# Patient Record
Sex: MALE | Race: WHITE
[De-identification: names, ages, dates, MRNs, and addresses within clinical notes are randomized per-mention and may not be internally consistent; named-entity substitution may affect disease eponyms.]

---

## 2018-06-05 ENCOUNTER — RX ONLY (OUTPATIENT)
Age: 68
Setting detail: RX ONLY
End: 2018-06-05

## 2018-09-05 ENCOUNTER — APPOINTMENT (RX ONLY)
Dept: URBAN - METROPOLITAN AREA CLINIC 322 | Facility: CLINIC | Age: 68
Setting detail: DERMATOLOGY
End: 2018-09-05

## 2018-09-05 DIAGNOSIS — L259 CONTACT DERMATITIS AND OTHER ECZEMA, UNSPECIFIED CAUSE: ICD-10-CM

## 2018-09-05 PROBLEM — J30.1 ALLERGIC RHINITIS DUE TO POLLEN: Status: ACTIVE | Noted: 2018-09-05

## 2018-09-05 PROBLEM — L40.0 PSORIASIS VULGARIS: Status: ACTIVE | Noted: 2018-09-05

## 2018-09-05 PROBLEM — I10 ESSENTIAL (PRIMARY) HYPERTENSION: Status: ACTIVE | Noted: 2018-09-05

## 2018-09-05 PROBLEM — L23.9 ALLERGIC CONTACT DERMATITIS, UNSPECIFIED CAUSE: Status: ACTIVE | Noted: 2018-09-05

## 2018-09-05 PROCEDURE — ? COUNSELING

## 2018-09-05 PROCEDURE — ? TREATMENT REGIMEN

## 2018-09-05 PROCEDURE — ? PRESCRIPTION

## 2018-09-05 PROCEDURE — 99213 OFFICE O/P EST LOW 20 MIN: CPT

## 2018-09-05 RX ORDER — TRIAMCINOLONE ACETONIDE 1 MG/G
CREAM TOPICAL
Qty: 1 | Refills: 2 | Status: ERX | COMMUNITY
Start: 2018-09-05

## 2018-09-05 RX ADMIN — TRIAMCINOLONE ACETONIDE: 1 CREAM TOPICAL at 18:41

## 2018-09-05 ASSESSMENT — LOCATION SIMPLE DESCRIPTION DERM: LOCATION SIMPLE: RIGHT UPPER BACK

## 2018-09-05 ASSESSMENT — LOCATION DETAILED DESCRIPTION DERM: LOCATION DETAILED: RIGHT INFERIOR LATERAL UPPER BACK

## 2018-09-05 ASSESSMENT — LOCATION ZONE DERM: LOCATION ZONE: TRUNK

## 2018-09-05 NOTE — PROCEDURE: TREATMENT REGIMEN
Plan: Emphasized importance of switching to all hypoallergenic products - vanicream or free and clear only x 3 weeks, including shampoos.\\nMay use ELidel and TAC 0.1% cream bid prn flares\\nConsider patch testing - pt will schedule when convenient
Detail Level: Zone

## 2018-09-13 ENCOUNTER — APPOINTMENT (RX ONLY)
Dept: URBAN - METROPOLITAN AREA CLINIC 166 | Facility: CLINIC | Age: 68
Setting detail: DERMATOLOGY
End: 2018-09-13

## 2018-09-13 DIAGNOSIS — L21.8 OTHER SEBORRHEIC DERMATITIS: ICD-10-CM

## 2018-09-13 DIAGNOSIS — L20.89 OTHER ATOPIC DERMATITIS: ICD-10-CM

## 2018-09-13 PROBLEM — E78.5 HYPERLIPIDEMIA, UNSPECIFIED: Status: ACTIVE | Noted: 2018-09-13

## 2018-09-13 PROBLEM — I10 ESSENTIAL (PRIMARY) HYPERTENSION: Status: ACTIVE | Noted: 2018-09-13

## 2018-09-13 PROCEDURE — 99202 OFFICE O/P NEW SF 15 MIN: CPT

## 2018-09-13 PROCEDURE — ? PRESCRIPTION

## 2018-09-13 PROCEDURE — ? COUNSELING

## 2018-09-13 PROCEDURE — ? OTHER

## 2018-09-13 PROCEDURE — ? TREATMENT REGIMEN

## 2018-09-13 RX ORDER — METRONIDAZOLE 7.5 MG/G
CREAM TOPICAL
Qty: 1 | Refills: 2 | Status: ERX | COMMUNITY
Start: 2018-09-13

## 2018-09-13 RX ORDER — KETOCONAZOLE 20 MG/G
CREAM TOPICAL BID
Qty: 1 | Refills: 3 | Status: ERX | COMMUNITY
Start: 2018-09-13

## 2018-09-13 RX ADMIN — KETOCONAZOLE: 20 CREAM TOPICAL at 00:00

## 2018-09-13 RX ADMIN — METRONIDAZOLE: 7.5 CREAM TOPICAL at 00:00

## 2018-09-13 ASSESSMENT — LOCATION ZONE DERM
LOCATION ZONE: HAND
LOCATION ZONE: SCALP
LOCATION ZONE: FACE
LOCATION ZONE: ARM

## 2018-09-13 ASSESSMENT — LOCATION DETAILED DESCRIPTION DERM
LOCATION DETAILED: LEFT ULNAR DORSAL HAND
LOCATION DETAILED: LEFT INFERIOR CENTRAL MALAR CHEEK
LOCATION DETAILED: RIGHT ANTERIOR PROXIMAL UPPER ARM
LOCATION DETAILED: RIGHT ULNAR DORSAL HAND
LOCATION DETAILED: POSTERIOR MID-PARIETAL SCALP

## 2018-09-13 ASSESSMENT — LOCATION SIMPLE DESCRIPTION DERM
LOCATION SIMPLE: RIGHT UPPER ARM
LOCATION SIMPLE: LEFT HAND
LOCATION SIMPLE: POSTERIOR SCALP
LOCATION SIMPLE: LEFT CHEEK
LOCATION SIMPLE: RIGHT HAND

## 2018-09-13 NOTE — PROCEDURE: TREATMENT REGIMEN
Plan: Wash with head and shoulders shampoo
Detail Level: Zone
Discontinue Regimen: All other products
Initiate Treatment: Metronidazole cream twice a day
Continue Regimen: Triamcinolone cream. Patient has prescription at home
Plan: Keep skin moisturized

## 2018-09-13 NOTE — HPI: RASH
How Severe Is Your Rash?: moderate
Is This A New Presentation, Or A Follow-Up?: Rash
Additional History: Patient was using Elidel and Triamcinolone cream on his face for the last 6 week without taking a break. Stopped using creams  on Friday and his face flared on Saturday. Currently using Aloe Vera gel and Vanicream lite. Also using CBD oil. Previously differential diagnosis was Atopic dermatitis.

## 2018-09-13 NOTE — PROCEDURE: OTHER
Other (Free Text): Today appears to have rebound erythema from overuse of topical elidel & triamcinolone. Patient with baseline diagnosis of \"atopic dermatitis\" from prior derm, but today I see evidence of seborrheic dermatitis with erythema (from overuse of topical steroids). I'm going to recommend discontinue elidel & triamcinolone for the face. Instead, to use ketoconazole cream BID and Head and Shoulders shampoo. F/u in 3 weeks. At that time can consider adding a low potency topical steroid vs. biopsy.\\n\\n(The metronidazole prescription was an error and I called pharmacy to cancel the rx and instead fill the ketoconazole. Left voicemail with patient's home and patient's wife about error.)
Note Text (......Xxx Chief Complaint.): This diagnosis correlates with the
Detail Level: Zone

## 2018-09-17 ENCOUNTER — APPOINTMENT (RX ONLY)
Dept: URBAN - METROPOLITAN AREA CLINIC 170 | Facility: CLINIC | Age: 68
Setting detail: DERMATOLOGY
End: 2018-09-17

## 2018-09-17 DIAGNOSIS — L259 CONTACT DERMATITIS AND OTHER ECZEMA, UNSPECIFIED CAUSE: ICD-10-CM

## 2018-09-17 PROBLEM — L30.9 DERMATITIS, UNSPECIFIED: Status: ACTIVE | Noted: 2018-09-17

## 2018-09-17 PROCEDURE — 11100: CPT

## 2018-09-17 PROCEDURE — ? BIOPSY BY PUNCH METHOD

## 2018-09-17 PROCEDURE — ? COUNSELING

## 2018-09-17 PROCEDURE — ? PRESCRIPTION

## 2018-09-17 PROCEDURE — ? TREATMENT REGIMEN

## 2018-09-17 RX ORDER — HYDROCORTISONE 25 MG/G
CREAM TOPICAL
Qty: 1 | Refills: 1 | Status: ERX | COMMUNITY
Start: 2018-09-17

## 2018-09-17 RX ADMIN — HYDROCORTISONE: 25 CREAM TOPICAL at 00:00

## 2018-09-17 ASSESSMENT — LOCATION DETAILED DESCRIPTION DERM
LOCATION DETAILED: RIGHT INFERIOR CRUS OF ANTIHELIX
LOCATION DETAILED: LEFT CENTRAL MALAR CHEEK
LOCATION DETAILED: RIGHT MID PREAURICULAR CHEEK
LOCATION DETAILED: LEFT SUPERIOR CRUS OF ANTIHELIX

## 2018-09-17 ASSESSMENT — LOCATION SIMPLE DESCRIPTION DERM
LOCATION SIMPLE: LEFT CHEEK
LOCATION SIMPLE: LEFT EAR
LOCATION SIMPLE: RIGHT CHEEK
LOCATION SIMPLE: RIGHT EAR

## 2018-09-17 ASSESSMENT — LOCATION ZONE DERM
LOCATION ZONE: FACE
LOCATION ZONE: EAR

## 2018-09-17 NOTE — PROCEDURE: BIOPSY BY PUNCH METHOD
Hemostasis: None
Lab Facility: [id number]
Home Suture Removal Text: Patient was provided a home suture removal kit and will remove their sutures at home.  If they have any questions or difficulties they will call the office.
Suture Removal: 10 days
Anesthesia Type: 1% lidocaine with epinephrine
Billing Type: Third-Party Bill
Punch Size In Mm: 4
Bill 56939 For Specimen Handling/Conveyance To Laboratory?: no
Consent: Written consent was obtained and risks were reviewed including but not limited to scarring, infection, bleeding, scabbing, incomplete removal, nerve damage and allergy to anesthesia.
X Size Of Lesion In Cm (Optional): 0
Biopsy Type: H and E
Dressing: bandage
Epidermal Sutures: 4-0 Nylon
Detail Level: Detailed
Post-Care Instructions: I reviewed with the patient in detail post-care instructions. Patient is to keep the biopsy site dry overnight, and then apply bacitracin twice daily until healed. Patient may apply hydrogen peroxide soaks to remove any crusting.
Lab: [id number]
Notification Instructions: Patient will be notified of biopsy results. However, patient instructed to call the office if not contacted within 2 weeks.
Was A Bandage Applied: Yes
Anesthesia Volume In Cc (Will Not Render If 0): 0.5
Wound Care: Petrolatum

## 2018-09-17 NOTE — PROCEDURE: TREATMENT REGIMEN
Plan: Will apply patch tests this Friday at follow up.
Detail Level: Zone
Initiate Treatment: Apply hydrocortisone 2.5 cream to affected areas of rash on face and ears twice a day x2 weeks as needed. Will follow up on Friday.
Discontinue Regimen: Ketoconazole, continue not using elidel, and fragrant products.

## 2018-09-21 ENCOUNTER — APPOINTMENT (RX ONLY)
Dept: URBAN - METROPOLITAN AREA CLINIC 167 | Facility: CLINIC | Age: 68
Setting detail: DERMATOLOGY
End: 2018-09-21

## 2018-09-21 DIAGNOSIS — Z48.02 ENCOUNTER FOR REMOVAL OF SUTURES: ICD-10-CM

## 2018-09-21 DIAGNOSIS — L259 CONTACT DERMATITIS AND OTHER ECZEMA, UNSPECIFIED CAUSE: ICD-10-CM

## 2018-09-21 PROBLEM — L30.9 DERMATITIS, UNSPECIFIED: Status: ACTIVE | Noted: 2018-09-21

## 2018-09-21 PROCEDURE — ? STAPLE REMOVAL

## 2018-09-21 PROCEDURE — ? OTHER

## 2018-09-21 PROCEDURE — ? PATCH TESTING

## 2018-09-21 PROCEDURE — ? TREATMENT REGIMEN

## 2018-09-21 PROCEDURE — 95044 PATCH/APPLICATION TESTS: CPT

## 2018-09-21 PROCEDURE — ? COUNSELING

## 2018-09-21 ASSESSMENT — LOCATION SIMPLE DESCRIPTION DERM
LOCATION SIMPLE: RIGHT EAR
LOCATION SIMPLE: RIGHT CHEEK
LOCATION SIMPLE: LEFT EAR
LOCATION SIMPLE: LEFT CHEEK

## 2018-09-21 ASSESSMENT — LOCATION DETAILED DESCRIPTION DERM
LOCATION DETAILED: RIGHT MID PREAURICULAR CHEEK
LOCATION DETAILED: LEFT CENTRAL MALAR CHEEK
LOCATION DETAILED: RIGHT INFERIOR CRUS OF ANTIHELIX
LOCATION DETAILED: LEFT SUPERIOR CRUS OF ANTIHELIX

## 2018-09-21 ASSESSMENT — LOCATION ZONE DERM
LOCATION ZONE: FACE
LOCATION ZONE: EAR

## 2018-09-21 NOTE — PROCEDURE: STAPLE REMOVAL
Detail Level: Detailed
Add 37303 Cpt? (Important Note: In 2017 The Use Of 49345 Is Being Tracked By Cms To Determine Future Global Period Reimbursement For Global Periods): no

## 2018-09-21 NOTE — PROCEDURE: OTHER
Note Text (......Xxx Chief Complaint.): This diagnosis correlates with the
Other (Free Text): biopsy results: spongiotic reaction pattern: Ddx allergic contact dermatitis vs. seb derm. \\n\\nImproving on HC 2.5%. Will apply patch testing TRUE test today. F/u 3 days.
Detail Level: Zone

## 2018-09-24 ENCOUNTER — APPOINTMENT (RX ONLY)
Dept: URBAN - METROPOLITAN AREA CLINIC 170 | Facility: CLINIC | Age: 68
Setting detail: DERMATOLOGY
End: 2018-09-24

## 2018-09-24 DIAGNOSIS — L259 CONTACT DERMATITIS AND OTHER ECZEMA, UNSPECIFIED CAUSE: ICD-10-CM

## 2018-09-24 PROBLEM — L30.9 DERMATITIS, UNSPECIFIED: Status: ACTIVE | Noted: 2018-09-24

## 2018-09-24 PROCEDURE — ? TREATMENT REGIMEN

## 2018-09-24 PROCEDURE — ? COUNSELING

## 2018-09-24 PROCEDURE — ? TRUE TEST READING

## 2018-09-24 PROCEDURE — 99212 OFFICE O/P EST SF 10 MIN: CPT

## 2018-09-24 ASSESSMENT — LOCATION ZONE DERM
LOCATION ZONE: EAR
LOCATION ZONE: FACE

## 2018-09-24 ASSESSMENT — LOCATION SIMPLE DESCRIPTION DERM
LOCATION SIMPLE: LEFT EAR
LOCATION SIMPLE: RIGHT EAR
LOCATION SIMPLE: LEFT CHEEK

## 2018-09-24 ASSESSMENT — LOCATION DETAILED DESCRIPTION DERM
LOCATION DETAILED: RIGHT INFERIOR CRUS OF ANTIHELIX
LOCATION DETAILED: LEFT CENTRAL MALAR CHEEK
LOCATION DETAILED: LEFT SUPERIOR CRUS OF ANTIHELIX

## 2018-09-24 NOTE — PROCEDURE: TREATMENT REGIMEN
Detail Level: Zone
Plan: Head and shoulders shampoo once a day
Continue Regimen: Hydrocortisone cream 2.5% twice a day in two week intervals

## 2018-09-24 NOTE — PROCEDURE: TRUE TEST READING
Paraben Mix: no reaction
Number Of Patches Read: 36
Detail Level: Zone
Show Allergen Counseling In The Note?: No
Show Negative Results In The Note?: Yes

## 2018-10-01 ENCOUNTER — APPOINTMENT (RX ONLY)
Dept: URBAN - METROPOLITAN AREA CLINIC 170 | Facility: CLINIC | Age: 68
Setting detail: DERMATOLOGY
End: 2018-10-01

## 2018-10-01 DIAGNOSIS — L21.8 OTHER SEBORRHEIC DERMATITIS: ICD-10-CM

## 2018-10-01 DIAGNOSIS — L20.89 OTHER ATOPIC DERMATITIS: ICD-10-CM

## 2018-10-01 DIAGNOSIS — L259 CONTACT DERMATITIS AND OTHER ECZEMA, UNSPECIFIED CAUSE: ICD-10-CM | Status: STABLE

## 2018-10-01 PROBLEM — L30.9 DERMATITIS, UNSPECIFIED: Status: ACTIVE | Noted: 2018-10-01

## 2018-10-01 PROCEDURE — 99212 OFFICE O/P EST SF 10 MIN: CPT | Mod: 25

## 2018-10-01 PROCEDURE — ? COUNSELING

## 2018-10-01 PROCEDURE — ? TREATMENT REGIMEN

## 2018-10-01 PROCEDURE — ? OTHER

## 2018-10-01 PROCEDURE — 11100: CPT

## 2018-10-01 PROCEDURE — ? BIOPSY BY SHAVE METHOD

## 2018-10-01 PROCEDURE — 11101: CPT

## 2018-10-01 ASSESSMENT — LOCATION SIMPLE DESCRIPTION DERM
LOCATION SIMPLE: LEFT EAR
LOCATION SIMPLE: RIGHT EAR
LOCATION SIMPLE: RIGHT WRIST
LOCATION SIMPLE: LEFT CHEEK
LOCATION SIMPLE: LEFT FOREHEAD

## 2018-10-01 ASSESSMENT — LOCATION ZONE DERM
LOCATION ZONE: ARM
LOCATION ZONE: FACE
LOCATION ZONE: EAR

## 2018-10-01 ASSESSMENT — LOCATION DETAILED DESCRIPTION DERM
LOCATION DETAILED: RIGHT INFERIOR CRUS OF ANTIHELIX
LOCATION DETAILED: LEFT FOREHEAD
LOCATION DETAILED: LEFT CENTRAL MALAR CHEEK
LOCATION DETAILED: RIGHT LATERAL DORSAL WRIST
LOCATION DETAILED: LEFT SUPERIOR CRUS OF ANTIHELIX

## 2018-10-01 NOTE — PROCEDURE: BIOPSY BY SHAVE METHOD
Electrodesiccation And Curettage Text: The wound bed was treated with electrodesiccation and curettage after the biopsy was performed.
Silver Nitrate Text: The wound bed was treated with silver nitrate after the biopsy was performed.
Curettage Text: The wound bed was treated with curettage after the biopsy was performed.
Anesthesia Volume In Cc (Will Not Render If 0): 0.5
Billing Type: Third-Party Bill
Bill For Surgical Tray: no
Consent: Written consent was obtained and risks were reviewed including but not limited to scarring, infection, bleeding, scabbing, incomplete removal, nerve damage and allergy to anesthesia.
Cryotherapy Text: The wound bed was treated with cryotherapy after the biopsy was performed.
Notification Instructions: Patient will be notified of biopsy results. However, patient instructed to call the office if not contacted within 2 weeks.
Electrodesiccation Text: The wound bed was treated with electrodesiccation after the biopsy was performed.
X Size Of Lesion In Cm: 0
Detail Level: Detailed
Depth Of Biopsy: dermis
Biopsy Method: Dermablade
Path Notes (To The Dermatopathologist): Previously biopsied on 9/17 as spongiotic reaction. Failed treatment (worsened) on ketoconazole and elidel. Stable on hydrocortisone on 2.5% but seeing no improvement. No results on TRUE patch test. Symptoms: burning and itch. \\n\\nFew days of pruritic eruption on arms
Biopsy Type: H and E
Type Of Destruction Used: Curettage
Was A Bandage Applied: Yes
Anesthesia Type: 1% lidocaine with epinephrine
Dressing: bandage
Post-Care Instructions: I reviewed with the patient in detail post-care instructions. Patient is to keep the biopsy site dry overnight, and then apply bacitracin twice daily until healed. Patient may apply hydrogen peroxide soaks to remove any crusting.
Lab Facility: [id number]
Hemostasis: Drysol
Lab: [id number]
Wound Care: Petrolatum
Lab Facility: [id number]
Billing Type: Third-Party Bill
Lab: [id number]

## 2018-10-01 NOTE — PROCEDURE: OTHER
Detail Level: Zone
Note Text (......Xxx Chief Complaint.): This diagnosis correlates with the
Other (Free Text): Previously biopsied on 9/17 as spongiotic reaction. Failed treatment (worsened) on ketoconazole and elidel. Stable on hydrocortisone on 2.5% but seeing no improvement. No results on TRUE patch test. Symptoms: burning and itch.

## 2018-10-01 NOTE — PROCEDURE: TREATMENT REGIMEN
Detail Level: Zone
Otc Regimen: Zyrtec once a day
Plan: Head and shoulders shampoo once a day\\n\\nLab order given for Chem panel Basic, CBC, Syphillis, TSH
Continue Regimen: Hydrocortisone cream 2.5% every other day

## 2018-10-10 ENCOUNTER — RX ONLY (OUTPATIENT)
Age: 68
Setting detail: RX ONLY
End: 2018-10-10

## 2018-10-10 RX ORDER — PIMECROLIMUS 10 MG/G
CREAM TOPICAL
Qty: 1 | Refills: 5 | Status: ERX | COMMUNITY
Start: 2018-10-10

## 2018-10-12 ENCOUNTER — RX ONLY (OUTPATIENT)
Age: 68
Setting detail: RX ONLY
End: 2018-10-12

## 2018-10-12 RX ORDER — TACROLIMUS 1 MG/G
OINTMENT TOPICAL
Qty: 1 | Refills: 5 | Status: ERX | COMMUNITY
Start: 2018-10-12

## 2018-12-31 ENCOUNTER — APPOINTMENT (RX ONLY)
Dept: URBAN - METROPOLITAN AREA CLINIC 170 | Facility: CLINIC | Age: 68
Setting detail: DERMATOLOGY
End: 2018-12-31

## 2018-12-31 DIAGNOSIS — L21.8 OTHER SEBORRHEIC DERMATITIS: ICD-10-CM

## 2018-12-31 PROBLEM — L30.9 DERMATITIS, UNSPECIFIED: Status: ACTIVE | Noted: 2018-12-31

## 2018-12-31 PROCEDURE — ? COUNSELING

## 2018-12-31 PROCEDURE — 99212 OFFICE O/P EST SF 10 MIN: CPT

## 2018-12-31 PROCEDURE — ? PRESCRIPTION

## 2018-12-31 PROCEDURE — ? TREATMENT REGIMEN

## 2018-12-31 RX ORDER — DOXYCYCLINE 100 MG/1
CAPSULE ORAL
Qty: 60 | Refills: 0 | Status: ERX | COMMUNITY
Start: 2018-12-31

## 2018-12-31 RX ADMIN — DOXYCYCLINE: 100 CAPSULE ORAL at 00:00

## 2018-12-31 ASSESSMENT — LOCATION DETAILED DESCRIPTION DERM: LOCATION DETAILED: RIGHT LATERAL FOREHEAD

## 2018-12-31 ASSESSMENT — LOCATION ZONE DERM: LOCATION ZONE: FACE

## 2018-12-31 ASSESSMENT — LOCATION SIMPLE DESCRIPTION DERM: LOCATION SIMPLE: RIGHT FOREHEAD

## 2018-12-31 NOTE — PROCEDURE: TREATMENT REGIMEN
Continue Regimen: Protopic
Initiate Treatment: Doxycycline 100mg twice a day
Detail Level: Zone
Plan: If not resolving consider another biopsy at next visit
Samples Given: Eucrisa to the the body \\nSoolantra twice a day to the face

## 2019-01-07 RX ORDER — HYDROCORTISONE 25 MG/G
CREAM TOPICAL
Qty: 1 | Refills: 1 | Status: ERX

## 2019-02-25 ENCOUNTER — APPOINTMENT (RX ONLY)
Dept: URBAN - METROPOLITAN AREA CLINIC 170 | Facility: CLINIC | Age: 69
Setting detail: DERMATOLOGY
End: 2019-02-25

## 2019-02-25 DIAGNOSIS — L259 CONTACT DERMATITIS AND OTHER ECZEMA, UNSPECIFIED CAUSE: ICD-10-CM

## 2019-02-25 PROBLEM — L23.9 ALLERGIC CONTACT DERMATITIS, UNSPECIFIED CAUSE: Status: ACTIVE | Noted: 2019-02-25

## 2019-02-25 PROCEDURE — ? COUNSELING

## 2019-02-25 PROCEDURE — ? OTHER

## 2019-02-25 PROCEDURE — 99212 OFFICE O/P EST SF 10 MIN: CPT

## 2019-02-25 ASSESSMENT — LOCATION SIMPLE DESCRIPTION DERM: LOCATION SIMPLE: LEFT CHEEK

## 2019-02-25 ASSESSMENT — LOCATION DETAILED DESCRIPTION DERM: LOCATION DETAILED: LEFT MEDIAL MALAR CHEEK

## 2019-02-25 ASSESSMENT — LOCATION ZONE DERM: LOCATION ZONE: FACE

## 2019-02-25 NOTE — PROCEDURE: OTHER
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 Patient will speak with cardiologist about switching class of anti hypertensive. \\n\\nConsider trying Protopic and desonide twice a day for two weeks AFTER a trial of off the medication
Note Text (......Xxx Chief Complaint.): This diagnosis correlates with the
Detail Level: Zone